# Patient Record
Sex: FEMALE | Race: WHITE | Employment: OTHER | URBAN - METROPOLITAN AREA
[De-identification: names, ages, dates, MRNs, and addresses within clinical notes are randomized per-mention and may not be internally consistent; named-entity substitution may affect disease eponyms.]

---

## 2019-04-26 LAB — MAMMOGRAM: NORMAL

## 2020-10-26 ENCOUNTER — TELEPHONE (OUTPATIENT)
Dept: DERMATOLOGY | Facility: CLINIC | Age: 75
End: 2020-10-26

## 2020-10-26 NOTE — TELEPHONE ENCOUNTER
Called patient to schedule procedure with Dr. Jung, there was no answer.  Left message with my direct line [phone].

## 2020-11-11 ENCOUNTER — MEDICAL CORRESPONDENCE (OUTPATIENT)
Dept: HEALTH INFORMATION MANAGEMENT | Facility: CLINIC | Age: 75
End: 2020-11-11

## 2020-11-11 ENCOUNTER — TRANSFERRED RECORDS (OUTPATIENT)
Dept: HEALTH INFORMATION MANAGEMENT | Facility: CLINIC | Age: 75
End: 2020-11-11

## 2020-11-23 ENCOUNTER — VIRTUAL VISIT (OUTPATIENT)
Dept: DERMATOLOGY | Facility: CLINIC | Age: 75
End: 2020-11-23
Payer: COMMERCIAL

## 2020-11-23 DIAGNOSIS — C44.311 BASAL CELL CARCINOMA OF DORSUM OF NOSE: Primary | ICD-10-CM

## 2020-11-23 PROCEDURE — T1013 SIGN LANG/ORAL INTERPRETER: HCPCS

## 2020-11-23 PROCEDURE — 99441 PR PHYSICIAN TELEPHONE EVALUATION 5-10 MIN: CPT | Mod: 95 | Performed by: DERMATOLOGY

## 2020-11-23 RX ORDER — MULTIVIT WITH IRON,MINERALS
LIQUID (ML) ORAL
COMMUNITY

## 2020-11-23 RX ORDER — TOLTERODINE TARTRATE 2 MG/1
2 TABLET, EXTENDED RELEASE ORAL DAILY
COMMUNITY

## 2020-11-23 RX ORDER — SIMVASTATIN 80 MG
80 TABLET ORAL AT BEDTIME
COMMUNITY

## 2020-11-23 RX ORDER — ALENDRONATE SODIUM 70 MG/1
70 TABLET ORAL
COMMUNITY

## 2020-11-23 ASSESSMENT — PAIN SCALES - GENERAL: PAINLEVEL: NO PAIN (0)

## 2020-11-23 NOTE — NURSING NOTE
Chief Complaint   Patient presents with     Derm Problem     mohs consult BCC nose     Carmen MASTERSON CMA

## 2020-11-23 NOTE — LETTER
11/23/2020       RE: Mariana Arteaga  [address]  [place] [address]     Dear Colleague,    Thank you for referring your patient, Mariana Arteaga, to the [facility] DERMATOLOGIC SURGERY CLINIC [place] at [facility]. Please see a copy of my visit note below.    Hx of Skin Cancer: No  Hx of Mohs Surgery: No  Transplant: No  Current Anticoagulant(s): None  Bleeding Disorder(s): No  Stent: No  Pacemaker: No  Defibrillator: No  Brain/Nerve Stimulator: No  Vascular graft: No  Prophylactic Antibiotic Needed: No  Congenital heart defect: No  Prosthetic Heart Valve: No  Lesion on Leg/Groin: No  Prosthetic Joint : No  Diabetic: No  HIV/AIDS: No  Hepatitis: No  Pregnant: No  Prior Problem with Local Anesthesia: No  Patient wears CPAP mask: No  Photoprotection: occasionally  Sunburns: 1-5 in lifetime  Tanning Bed Use: never  Current Tobacco Use: No  Current Alcohol Use: No  Extended Care Facility: No  Occupation: retired  Referring MD: Dr. Suarez   needed?: Yes  Do you have mobility issues?: No  Do you use any assistive devices/DME?: No  Do you have any issues with lying for long periods of time?: No        [facility] Dermatologic Surgery Consult Note    Dermatology Problem List:  BCC L nasal bridge    CC:   Chief Complaint   Patient presents with     Derm Problem     mohs consult BCC nose       Encounter Date: Nov 23, 2020    Subjective:  Mariana Arteaga is a 75 year old female who is referred to me by,  [facility]* for   on the  .      Patient complains of a lesion on the   for  .  Patient describes lesion as  .    Past Medical History:   There is no problem list on file for this patient.    No past medical history on file.  No past surgical history on file.    Social History:  MOHS CONSULT FLOWSHEET    Family History:  MOHS CONSULT FLOWSHEET    Medications:  Current Outpatient Medications   Medication Sig  Dispense Refill     alendronate (FOSAMAX) 70 MG tablet Take 70 mg by mouth every 7 days       Bioflavonoid Products (VITAMIN C) CHEW        MAGNESIUM PO        Multiple Vitamins-Minerals (LYSIPLEX PLUS) LIQD        Nutritional Supplements (VITAMIN D BOOSTER PO)        simvastatin (ZOCOR) 80 MG tablet Take 80 mg by mouth At Bedtime       tolterodine (DETROL) 2 MG tablet Take 2 mg by mouth daily       Allergies   Allergen Reactions     Ranitidine Difficulty breathing       Pre-Operative Risk Factors:  Current Anticoagulant(s): None.   .    Cardiac Devices:  Stent:   .   Pacemaker:  .   Defibrillator:   .    Transmissible Diseases:  HIV/AIDS:   .   Hepatitis:   .  Patient pregnant:  .    Review of Systems  Constitutional: negative  Eyes: negative  Ears, nose, mouth, throat, and face: negative  Respiratory: negative  Cardiovascular: negative  Gastrointestinal: negative  Genitourinary:negative  Hematologic/lymphatic: negative  Neurological: negative  Behavioral/Psych: negative    Objective:  Physical Exam:  General appearance:  This is a well developed, well-nourished female in no acute distress, in a pleasant mood.    Skin: Focused examination of the face was performed.. Skin exam is normal, except for the following:  Pigmented pearly papule on nasal bridge        Pathologic Findings:  [facility] path -- Pigmented and nodular BCC      Assessment/Plan:  Lesion 1: Pigmented BCC nasal bridge -- discussed R/B of Mohs and likely flap repair.  Patient will schedule surgery.        CC Dr. Hackett on close of this encounter.  Follow-up for surgery    Staff Involved:  Staff Only    Nate Jung MD                        Again, thank you for allowing me to participate in the care of your patient.      Sincerely,    Nate Jung MD

## 2020-11-23 NOTE — LETTER
11/23/2020      RE: Mariana Arteaga  [address]  [place] [address]       Hx of Skin Cancer: No  Hx of Mohs Surgery: No  Transplant: No  Current Anticoagulant(s): None  Bleeding Disorder(s): No  Stent: No  Pacemaker: No  Defibrillator: No  Brain/Nerve Stimulator: No  Vascular graft: No  Prophylactic Antibiotic Needed: No  Congenital heart defect: No  Prosthetic Heart Valve: No  Lesion on Leg/Groin: No  Prosthetic Joint : No  Diabetic: No  HIV/AIDS: No  Hepatitis: No  Pregnant: No  Prior Problem with Local Anesthesia: No  Patient wears CPAP mask: No  Photoprotection: occasionally  Sunburns: 1-5 in lifetime  Tanning Bed Use: never  Current Tobacco Use: No  Current Alcohol Use: No  Extended Care Facility: No  Occupation: retired  Referring MD: Dr. Suarez   needed?: Yes  Do you have mobility issues?: No  Do you use any assistive devices/DME?: No  Do you have any issues with lying for long periods of time?: No        [facility] Dermatologic Surgery Consult Note    Dermatology Problem List:  BCC L nasal bridge    CC:   Chief Complaint   Patient presents with     Derm Problem     mohs consult BCC nose       Encounter Date: Nov 23, 2020    Subjective:  Mariana Arteaga is a 75 year old female who is referred to me by,  [facility]* for   on the  .      Patient complains of a lesion on the   for  .  Patient describes lesion as  .    Past Medical History:   There is no problem list on file for this patient.    No past medical history on file.  No past surgical history on file.    Social History:  MOHS CONSULT FLOWSHEET    Family History:  MOHS CONSULT FLOWSHEET    Medications:  Current Outpatient Medications   Medication Sig Dispense Refill     alendronate (FOSAMAX) 70 MG tablet Take 70 mg by mouth every 7 days       Bioflavonoid Products (VITAMIN C) CHEW        MAGNESIUM PO        Multiple Vitamins-Minerals (LYSIPLEX PLUS) LIQD        Nutritional Supplements  (VITAMIN D BOOSTER PO)        simvastatin (ZOCOR) 80 MG tablet Take 80 mg by mouth At Bedtime       tolterodine (DETROL) 2 MG tablet Take 2 mg by mouth daily       Allergies   Allergen Reactions     Ranitidine Difficulty breathing       Pre-Operative Risk Factors:  Current Anticoagulant(s): None.   .    Cardiac Devices:  Stent:   .   Pacemaker:  .   Defibrillator:   .    Transmissible Diseases:  HIV/AIDS:   .   Hepatitis:   .  Patient pregnant:  .    Review of Systems  Constitutional: negative  Eyes: negative  Ears, nose, mouth, throat, and face: negative  Respiratory: negative  Cardiovascular: negative  Gastrointestinal: negative  Genitourinary:negative  Hematologic/lymphatic: negative  Neurological: negative  Behavioral/Psych: negative    Objective:  Physical Exam:  General appearance:  This is a well developed, well-nourished female in no acute distress, in a pleasant mood.    Skin: Focused examination of the face was performed.. Skin exam is normal, except for the following:  Pigmented pearly papule on nasal bridge        Pathologic Findings:  [facility] -- Pigmented and nodular BCC      Assessment/Plan:  Lesion 1: Pigmented BCC nasal bridge -- discussed R/B of Mohs and likely flap repair.  Patient will schedule surgery.        CC Dr. Hackett on close of this encounter.  Follow-up for surgery    Staff Involved:  Staff Only    Nate Jung MD

## 2020-11-24 ENCOUNTER — PRE VISIT (OUTPATIENT)
Dept: DERMATOLOGY | Facility: CLINIC | Age: 75
End: 2020-11-24

## 2020-11-24 ENCOUNTER — OFFICE VISIT (OUTPATIENT)
Dept: DERMATOLOGY | Facility: CLINIC | Age: 75
End: 2020-11-24
Payer: COMMERCIAL

## 2020-11-24 VITALS — HEART RATE: 80 BPM | DIASTOLIC BLOOD PRESSURE: 74 MMHG | SYSTOLIC BLOOD PRESSURE: 142 MMHG

## 2020-11-24 DIAGNOSIS — C44.311 BASAL CELL CARCINOMA OF DORSUM OF NOSE: ICD-10-CM

## 2020-11-24 DIAGNOSIS — G89.18 POST-OPERATIVE PAIN: Primary | ICD-10-CM

## 2020-11-24 PROCEDURE — 14060 TIS TRNFR E/N/E/L 10 SQ CM/<: CPT | Mod: GC | Performed by: DERMATOLOGY

## 2020-11-24 PROCEDURE — 17311 MOHS 1 STAGE H/N/HF/G: CPT | Mod: GC | Performed by: DERMATOLOGY

## 2020-11-24 RX ORDER — ACETAMINOPHEN 500 MG
1000 TABLET ORAL ONCE
Status: COMPLETED | OUTPATIENT
Start: 2020-11-24 | End: 2020-11-24

## 2020-11-24 RX ADMIN — Medication 1000 MG: at 11:00

## 2020-11-24 ASSESSMENT — PAIN SCALES - GENERAL: PAINLEVEL: NO PAIN (0)

## 2020-11-24 NOTE — TELEPHONE ENCOUNTER
FUTURE VISIT INFORMATION      FUTURE VISIT INFORMATION:    Date: 11.24.20    Time: 8:30    Location: [facility]  REFERRAL INFORMATION:    Referring provider:  Dr. Matilde Suarez    Referring providers clinic:  [facility]    Reason for visit/diagnosis  Mohs L nasal ala BCC Please use video or phone  at [phone] option 1    RECORDS REQUESTED FROM:       Clinic name Comments Records Status Photos Status   [facility] 11.23.20- telephone consult  Dr. Fabiana Ronquillo N/A   [facility] 10.13.20  Dr. Suarez  Path # [id number] [facility] Epic

## 2020-11-24 NOTE — LETTER
Date:December 3, 2020      Patient was self referred, no letter generated. Do not send.        [facility] Health Information

## 2020-11-24 NOTE — LETTER
11/24/2020       RE: Mariana Arteaga  [address]  [place] [address]     Dear Colleague,    Thank you for referring your patient, Mariana Arteaga, to the [facility] DERMATOLOGIC SURGERY CLINIC [place] at [facility]. Please see a copy of my visit note below.    [facility] Dermatologic Surgery Procedure Note      Date of Service:  Nov 24, 2020  Surgery: Mohs micrographic surgery    Case 1  Repair Type: rhombic transposition flap  Repair Size: 2.5 x 2.5 cm  Suture Material: 5-0 Monocryl and 5-0 Fast absorbing gut  Tumor Type: BCC  Location: L nasal bridge  Derm-Path Accession #: [id number]  PreOp Size: 0.8 x 0.6 cm  PostOp Size: 1.2 x 1.0 cm  Mohs Accession #:   Level of Defect: muscle      Procedure:  We discussed the principles of treatment and most likely complications including scarring, bleeding, infection, swelling, pain, crusting, nerve damage, large wound,  incomplete excision, wound dehiscence,  nerve damage, recurrence, and a second procedure may be recommended to obtain the best cosmetic or functional result.    Informed consent was obtained and the patient underwent the procedure as follows:  The patient was placed supine on the operating table.  The cancer was identified, outlined with a marker, and verified by the patient.  The entire surgical field was prepped with Hibiclens.  The surgical site was anesthetized using Lidocaine 1% with epi 1:100,000.      The area of clinically apparent tumor was debulked. The layer of tissue was then surgically excised using a #15 blade and was then transferred onto a specimen sheet maintaining the orientation of the specimen. Hemostasis was obtained using heat cautery. The wound site was then covered with a dressing while the tissue samples were processed for examination.    The excised tissue was transported to the Mohs histology laboratory maintaining the tissue  orientation.  The tissue specimen was relaxed so that the entire surgical margin was in a a single horizontal plane for sectioning and inked for precise mapping.  A precise reference map was drawn to reflect the sectioning of the specimen, colored inking of the margins, and orientation on the patient. The tissue was processed using horizontal sectioning of the base and continuous peripheral margins.  The histopathologic sections were reviewed in conjunction with the reference map.    Total blocks: 1    Total slides:  2    There were no cancer cells visualized on examination, therefore Mohs surgery was complete.    PROCEDURE: Rhombic Transposition Flap    The patient was taken to the operative suite and placed supine on the operating room table.  The wound was identified and infiltrated with 1% lidocaine with epinephrine.  The defect was then cleansed and prepped with chlorhexidine and draped with sterile drapes.  Using a marker, a rhomboid transposition flap repair was planned.  The wound edges were then debeveled and the wound was undermined bluntly in all directions.  The transposition flap was incised sharply to the level of fat.  The flap was undermined from all surrounding tissue. Hemostasis was obtained with electrocoagulation.  The flap was transposed into the primary defect.  The secondary defect and flap closed with deep dermal 5-0 Monocryl sutures Epidermal tissue was carefully approximated using 5-0 simple running epidermal sutures throughout the length of the flap.  Redundant skin was excised by the triangulation technique, and closed in similar fashion.  The wound was cleansed with sterile saline and Vaseline was applied. A sterile non-adherent pressure dressing was placed.  The patient left the operating suite in stable condition.  The patient will return for virtual follow up in 2 weeks. Wound care was reviewed verbally and in writing.     Nate Jung was present for the Mohs procedure and key portions  of the recontruction procedure and always immediately available.                Photo placed into patients chart note for further reference.       Justin Garcia MD  PGY-6    Micrographic Surgery and Dermatologic Oncology Fellow  November 24, 2020      Attending attestation:  I was present for key elements of the procedure and immediately available for all other portions of the procedure.  I have reviewed the note and edited it as necessary.    Nate Jung M.D.  Professor  Director of Dermatologic Surgery  Department of Dermatology  [facility]    Dermatology Surgery Clinic  [facility] [place] [address]                  Again, thank you for allowing me to participate in the care of your patient.      Sincerely,    Nate Jung MD

## 2020-11-24 NOTE — PATIENT INSTRUCTIONS
Wound Care Instructions  I will experience scar, altered skin color, bleeding, swelling, pain, crusting and redness. I may experience altered sensation. Risks are excessive bleeding, infection, muscle weakness, thick (hypertrophic or keloidal) scar, and recurrence,. A second procedure may be recommended to obtain the best cosmetic or functional result.  Possible complications of any surgical procedure are bleeding, infection, scarring, alteration in skin color and sensation, muscle weakness in the area, wound dehiscence or seperation, or recurrence of the lesion or disease. On occasion, after healing, a secondary procedure or revision may be recommended in order to obtain the best cosmetic or functional result.   After your surgery, a pressure bandage will be placed over the area that has sutures. This will help prevent bleeding.   For the First 48 hours After Surgery:  1. Leave the pressure bandage on and keep it dry. If it should come loose, you may retape it, but do not take it off.  2. Relax and take it easy. Do not do any vigorous exercise, heavy lifting, or bending forward. This could cause the wound to bleed.  3. Post-operative pain is usually mild. You may take plain or extra strength Tylenol every 4 hours as needed (do not take more than 4,000mg in one day). Do not take any medicine that contains aspirin, ibuprofen or motrin unless you have been recommended these by a doctor.  Avoid alcohol and vitamin E as these may increase your tendency to bleed.  4. You may put an ice pack around the bandaged area for 20 minutes every 2-3 hours. This may help reduce swelling, bruising, and pain. Make sure the ice pack is waterproof so that the pressure bandage does not get wet.   5. You may see a small amount of drainage or blood on your pressure bandage. This is normal. However, if drainage or bleeding continues or saturates the bandage, you will need to apply firm pressure over the bandage with a washcloth for 15  minutes. If bleeding continues after applying pressure for 15 minutes then go to the nearest emergency room.  48 Hours After Surgery  Carefully remove the bandage and start daily wound care and dressing changes. You may also now shower and get the wound wet. Wash wound with a mild soap and water.  Use caution when washing the wound. Be gentle and do not let the forceful shower stream hit the wound directly.  PAT dry.  Daily Wound Care:  1. Wash wound with a mild soap and water.  Use caution when washing the wound, be gentle and do not let the forceful shower stream hit the wound directly.  2. PAT DRY.  3. Apply Vaseline (from a new container or tube) over the suture line with a Q-tip. It is very important to keep the wound continuously moist, as wounds heal best in a moist environment.  4.  Keep the site covered until sutures are removed, you can cover it with a Telfa (non-stick) dressing and tape or a band-aid.    5. If you are unable to keep wound covered, you must apply Vaseline every 2 - 3 hours (while awake) to ensure it is being kept moist for optimal healing. A dressing overnight is recommended to keep the area moist.   Call Us If:  1. You have pain that is not controlled with Tylenol.  2. You have signs or symptoms of an infection, such as: fever over 100 degrees F, redness, warmth, or foul-smelling or yellow/creamy drainage from the wound.  Who should I call with questions?    [facility]: [phone]     [facility]: [phone]    For urgent needs outside of business hours call the [facility] at [phone] and ask for the dermatology resident on call

## 2020-11-24 NOTE — NURSING NOTE
Chief Complaint   Patient presents with     Derm Problem     L nasal bridge BCC     Meli Snowden, EMT

## 2020-11-24 NOTE — PROGRESS NOTES
[facility] Mohs Dermatologic Surgery Procedure Note      Date of Service:  Nov 24, 2020  Surgery: Mohs micrographic surgery    Case 1  Repair Type: rhombic transposition flap  Repair Size: 2.5 x 2.5 cm  Suture Material: 5-0 Monocryl and 5-0 Fast absorbing gut  Tumor Type: BCC  Location: L nasal bridge  Derm-Path Accession #: [id number]  PreOp Size: 0.8 x 0.6 cm  PostOp Size: 1.2 x 1.0 cm  Mohs Accession #:   Level of Defect: muscle      Procedure:  We discussed the principles of treatment and most likely complications including scarring, bleeding, infection, swelling, pain, crusting, nerve damage, large wound,  incomplete excision, wound dehiscence,  nerve damage, recurrence, and a second procedure may be recommended to obtain the best cosmetic or functional result.    Informed consent was obtained and the patient underwent the procedure as follows:  The patient was placed supine on the operating table.  The cancer was identified, outlined with a marker, and verified by the patient.  The entire surgical field was prepped with Hibiclens.  The surgical site was anesthetized using Lidocaine 1% with epi 1:100,000.      The area of clinically apparent tumor was debulked. The layer of tissue was then surgically excised using a #15 blade and was then transferred onto a specimen sheet maintaining the orientation of the specimen. Hemostasis was obtained using heat cautery. The wound site was then covered with a dressing while the tissue samples were processed for examination.    The excised tissue was transported to the Mohs histology laboratory maintaining the tissue orientation.  The tissue specimen was relaxed so that the entire surgical margin was in a a single horizontal plane for sectioning and inked for precise mapping.  A precise reference map was drawn to reflect the sectioning of the specimen, colored inking of the margins, and orientation on the patient. The tissue was processed using  horizontal sectioning of the base and continuous peripheral margins.  The histopathologic sections were reviewed in conjunction with the reference map.    Total blocks: 1    Total slides:  2    There were no cancer cells visualized on examination, therefore Mohs surgery was complete.    PROCEDURE: Rhombic Transposition Flap    The patient was taken to the operative suite and placed supine on the operating room table.  The wound was identified and infiltrated with 1% lidocaine with epinephrine.  The defect was then cleansed and prepped with chlorhexidine and draped with sterile drapes.  Using a marker, a rhomboid transposition flap repair was planned.  The wound edges were then debeveled and the wound was undermined bluntly in all directions.  The transposition flap was incised sharply to the level of fat.  The flap was undermined from all surrounding tissue. Hemostasis was obtained with electrocoagulation.  The flap was transposed into the primary defect.  The secondary defect and flap closed with deep dermal 5-0 Monocryl sutures Epidermal tissue was carefully approximated using 5-0 simple running epidermal sutures throughout the length of the flap.  Redundant skin was excised by the triangulation technique, and closed in similar fashion.  The wound was cleansed with sterile saline and Vaseline was applied. A sterile non-adherent pressure dressing was placed.  The patient left the operating suite in stable condition.  The patient will return for virtual follow up in 2 weeks. Wound care was reviewed verbally and in writing.     Nate Jung was present for the Mohs procedure and key portions of the recontruction procedure and always immediately available.                Photo placed into patients chart note for further reference.       Justin Garcia MD  PGY-6    Micrographic Surgery and Dermatologic Oncology Fellow  November 24, 2020      Attending attestation:  I was present for key elements of the procedure and  immediately available for all other portions of the procedure.  I have reviewed the note and edited it as necessary.    Nate Jung M.D.  Professor  Director of Dermatologic Surgery  Department of Dermatology  [facility]    Dermatology Surgery Clinic  [place] [phone]

## 2020-12-02 ENCOUNTER — DOCUMENTATION ONLY (OUTPATIENT)
Dept: CARE COORDINATION | Facility: CLINIC | Age: 75
End: 2020-12-02

## 2020-12-07 ENCOUNTER — VIRTUAL VISIT (OUTPATIENT)
Dept: DERMATOLOGY | Facility: CLINIC | Age: 75
End: 2020-12-07
Payer: COMMERCIAL

## 2020-12-07 DIAGNOSIS — Z85.828 HISTORY OF BASAL CELL CARCINOMA (BCC): Primary | ICD-10-CM

## 2020-12-07 DIAGNOSIS — Z98.890 STATUS POST MOHS SURGERY: ICD-10-CM

## 2020-12-07 PROCEDURE — 99024 POSTOP FOLLOW-UP VISIT: CPT | Mod: 95 | Performed by: DERMATOLOGY

## 2020-12-07 ASSESSMENT — PAIN SCALES - GENERAL: PAINLEVEL: NO PAIN (0)

## 2020-12-07 NOTE — PROGRESS NOTES
Preventive Care:    Colorectal Cancer Screening: During our visit today, we discussed that it is recommended you receive colorectal cancer screening. Please call or make an appointment with your primary care provider to discuss this. You may also call the [facility] scheduling line ([phone]) to set up a colonoscopy appointment.

## 2020-12-07 NOTE — LETTER
Date:December 18, 2020      Patient was self referred, no letter generated. Do not send.        [facility] Phys[facility] Health Information

## 2020-12-07 NOTE — NURSING NOTE
Chief Complaint   Patient presents with     Derm Problem     Mariana has a telephone visit to recheck nasal bridge.      Carmen Guajardo LPN

## 2020-12-07 NOTE — PROGRESS NOTES
[facility] Dermatology Record:  Store and Forward and Telephone [phone]      Dermatology Problem List:  Dermatology Problem List:  1. BCC, L nasal bridge, s/p MMS and rhombic transposition flap repair on 11/24/2020  2. Scar vs cyst: R upper buttock      Encounter Date: Dec 7, 2020    CC:   Chief Complaint   Patient presents with     Derm Problem     Mariana has a telephone visit to recheck nasal bridge.        History of Present Illness:  Mariana Arteaga is a 75 year old female who presents for follow up 2 weeks after MMS for BCC of the left nasal bridge and subsequent rhombic transposition flap repair.    Overall she feels that she is healing very well.  She reports applying Vaseline consistently, denies pain or drainage. She has had some itching near the medial aspect of each eye. Also notes some fullness over the left upper eyelid. She reports a history of stye/chalazion in the past.     Today's conversation was facilitated by a .     ROS: Patient is generally feeling well today.     Physical Examination:  Skin: Focused examination including photos submitted by the patient of the nasal bridge was performed.   - Over the central nasal bridge is a well healing, completely viable rhombic transposition flap without evidence of infection or flap compromise.   - Left upper eyelid with skin colored to pink nodule.     Labs:  NA    Past Medical History:   There is no problem list on file for this patient.    No past medical history on file.  No past surgical history on file.    Social History:  Patient reports that she has never smoked. She has never used smokeless tobacco.    Family History:  No family history on file.    Medications:  Current Outpatient Medications   Medication     alendronate (FOSAMAX) 70 MG tablet     Bioflavonoid Products (VITAMIN C) CHEW     MAGNESIUM PO     Multiple Vitamins-Minerals (LYSIPLEX PLUS) LIQD     Nutritional Supplements (VITAMIN D BOOSTER PO)     simvastatin  (ZOCOR) 80 MG tablet     tolterodine (DETROL) 2 MG tablet     No current facility-administered medications for this visit.           Allergies   Allergen Reactions     Ranitidine Difficulty breathing           Impression and Recommendations (Patient Counseled on the Following):  1. History of BCC, nasal bridge, s/p MMS and rhombic transposition flap closure   - Healing well, continue Vaseline over any areas that persistently crust. Anticipate no further wound care after 4-6 days.   - Follow up 3 months for scar check     2. Eyelid fullness, consistent with Stye/chalazion   - Left upper eyelid  - Recommend warm compresses and consultation with ophthalmology if persistent. Patient agrees. I informed the patient that dermatology can help if she is not able to be seen by ophthalmology.       Follow-up:   3 months for scar evaluation.      Staff and fellow    Justin Garcia MD  PGY-6    Micrographic Surgery and Dermatologic Oncology Fellow  December 7, 2020      _____________________________________________________________________________    Teledermatology information:  - Location of patient: [place]  - Patient presented as: return  - Location of teledermatologist:  ([facility] DERMATOLOG[facility] SURG[facility] CLINIC [place] )  - Image quality and interpretability: acceptable  - Physician has received verbal consent for a Video/Photos Visit from the patient? YES  - In-person dermatology visit recommendation: no  - Date of images: December 7, 2020  - Service start time: 1420  - Service end time: 1430  - Date of report: 12/7/2020

## 2020-12-07 NOTE — PATIENT INSTRUCTIONS
[facility] Dermatology Visit    Thank you for allowing us to participate in your care. Your findings, instructions and follow-up plan are as follows:    We will see you in 2-3 months to check on you.    When should I call my doctor?    If you are worsening or not improving, please, contact us or seek urgent care as noted below.     Who should I call with questions (adults)?    [facility] (adult and pediatric): [phone]     [facility] (adult): [phone]    For urgent needs outside of business hours call the [facility] at [phone] and ask for the dermatology resident on call    If this is a medical emergency and you are unable to reach an ER, Call [phone]      Who should I call with questions (pediatric)?  [facility]- Pediatric Dermatology  Dr. Amie Pedersen, Dr. Jenifer Ng, Dr. Sole Simpson, Michelle Hotl, PA  Dr. Darlene Araujo, Dr. Leanne Sapp & Dr. Yassine Phillips  Non Urgent  Nurse Triage Line; [phone]- Margie and Brielle NOBLES Care Coordinators   Tiffani (/Complex ) [phone]    If you need a prescription refill, please contact your pharmacy. Refills are approved or denied by our Physicians during normal business hours, Monday through Fridays  Per office policy, refills will not be granted if you have not been seen within the past year (or sooner depending on your child's condition)    Scheduling Information:  Pediatric Appointment Scheduling and Call Center [phone]  Radiology Scheduling- [phone]  Sedation Unit Scheduling- [phone]  [place] Scheduling- General [phone]; Pediatric Dermatology [phone]  Main  Services: [phone]  Turkish: [phone]  Welsh: [phone]  Hmong/Paraguayan/Sami: [phone]  Preadmission Nursing Department Fax Number: [phone] (Fax all pre-operative paperwork to this  number)    For urgent matters arising during evenings, weekends, or holidays that cannot wait for normal business hours please call [phone] and ask for the Dermatology Resident On-Call to be paged.

## 2020-12-21 NOTE — PROGRESS NOTES
Hx of Skin Cancer: No  Hx of Mohs Surgery: No  Transplant: No  Current Anticoagulant(s): None  Bleeding Disorder(s): No  Stent: No  Pacemaker: No  Defibrillator: No  Brain/Nerve Stimulator: No  Vascular graft: No  Prophylactic Antibiotic Needed: No  Congenital heart defect: No  Prosthetic Heart Valve: No  Lesion on Leg/Groin: No  Prosthetic Joint : No  Diabetic: No  HIV/AIDS: No  Hepatitis: No  Pregnant: No  Prior Problem with Local Anesthesia: No  Patient wears CPAP mask: No  Photoprotection: occasionally  Sunburns: 1-5 in lifetime  Tanning Bed Use: never  Current Tobacco Use: No  Current Alcohol Use: No  Extended Care Facility: No  Occupation: retired  Referring MD: Dr. Suarez   needed?: Yes  Do you have mobility issues?: No  Do you use any assistive devices/DME?: No  Do you have any issues with lying for long periods of time?: No        [facility] Dermatologic Surgery Consult Note    Dermatology Problem List:  BCC L nasal bridge    CC:   Chief Complaint   Patient presents with     Derm Problem     mohs consult BCC nose       Encounter Date: Nov 23, 2020    Subjective:  Mariana Arteaga is a 75 year old female who is referred to me by,  [facility]* for   on the  .      Patient complains of a lesion on the   for  .  Patient describes lesion as  .    Past Medical History:   There is no problem list on file for this patient.    No past medical history on file.  No past surgical history on file.    Social History:  MOHS CONSULT FLOWSHEET    Family History:  MOHS CONSULT FLOWSHEET    Medications:  Current Outpatient Medications   Medication Sig Dispense Refill     alendronate (FOSAMAX) 70 MG tablet Take 70 mg by mouth every 7 days       Bioflavonoid Products (VITAMIN C) CHEW        MAGNESIUM PO        Multiple Vitamins-Minerals (LYSIPLEX PLUS) LIQD        Nutritional Supplements (VITAMIN D BOOSTER PO)        simvastatin (ZOCOR) 80 MG tablet Take 80 mg by mouth At Bedtime        tolterodine (DETROL) 2 MG tablet Take 2 mg by mouth daily       Allergies   Allergen Reactions     Ranitidine Difficulty breathing       Pre-Operative Risk Factors:  Current Anticoagulant(s): None.   .    Cardiac Devices:  Stent:   .   Pacemaker:  .   Defibrillator:   .    Transmissible Diseases:  HIV/AIDS:   .   Hepatitis:   .  Patient pregnant:  .    Review of Systems  Constitutional: negative  Eyes: negative  Ears, nose, mouth, throat, and face: negative  Respiratory: negative  Cardiovascular: negative  Gastrointestinal: negative  Genitourinary:negative  Hematologic/lymphatic: negative  Neurological: negative  Behavioral/Psych: negative    Objective:  Physical Exam:  General appearance:  This is a well developed, well-nourished female in no acute distress, in a pleasant mood.    Skin: Focused examination of the face was performed.. Skin exam is normal, except for the following:  Pigmented pearly papule on nasal bridge        Pathologic Findings:  [facility] [place] -- Pigmented and nodular BCC      Assessment/Plan:  Lesion 1: Pigmented BCC nasal bridge -- discussed R/B of Mohs and likely flap repair.  Patient will schedule surgery.        CC Dr. Hackett on close of this encounter.  Follow-up for surgery    Staff Involved:  Staff Only    Nate Jung MD

## 2021-01-09 ENCOUNTER — HEALTH MAINTENANCE LETTER (OUTPATIENT)
Age: 76
End: 2021-01-09

## 2021-04-19 ENCOUNTER — VIRTUAL VISIT (OUTPATIENT)
Dept: DERMATOLOGY | Facility: CLINIC | Age: 76
End: 2021-04-19
Payer: COMMERCIAL

## 2021-04-19 DIAGNOSIS — Z51.89 VISIT FOR WOUND CHECK: Primary | ICD-10-CM

## 2021-04-19 PROCEDURE — 99024 POSTOP FOLLOW-UP VISIT: CPT | Mod: 95 | Performed by: DERMATOLOGY

## 2021-04-19 NOTE — LETTER
Date:April 27, 2021      Patient was self referred, no letter generated. Do not send.        [facility] Health Information

## 2021-04-19 NOTE — NURSING NOTE
Chief Complaint   Patient presents with     Derm Problem     wound check, no concerns at this time.      Meli Snowden, EMT

## 2021-04-19 NOTE — LETTER
4/19/2021       RE: Mariana Arteaga  [address]  [place] [address]     Dear Colleague,    Thank you for referring your patient, Mariana Arteaga, to the [facility] DERMATOLOGIC SURGERY CLINIC [place] at [facility]. Please see a copy of my visit note below.    [facility] Dermatology Note  Encounter Date: Apr 19, 2021  Store-and-Forward and Telephone ([phone]). Location of teledermatologist: [facility] DERMATOLOGIC SURGERY CLINIC [place].  Start time: 1500. End time: 1510.    Dermatology Problem List:  Dermatology Problem List:  1. BCC, L nasal bridge, s/p MMS and rhombic transposition flap repair on 11/24/2020  2. Scar vs cyst: R upper buttock    ____________________________________________    Assessment & Plan:     1. BCC, L nasal bridge, s/p MMS and rhombic transposition flap repair on 11/24/2020    Procedures Performed:    None    Follow-up: for regular skin exam every 6 months    Staff and Fellow:     Justin Garcia MD    ____________________________________________    CC: Derm Problem (wound check, no concerns at this time. )    HPI:  Ms. Mariana Arteaga is a(n) 76 year old female who presents today for follow-up  for wound check after Mohs surgery on the nasal bridge with transposition flap repair on 11/24/2021.     The patient is doing well and she has no complaints.     Patient is otherwise feeling well, without additional skin concerns.    Labs Reviewed:  N/A    Physical Exam:  Vitals: There were no vitals taken for this visit.  SKIN: Teledermatology photos were reviewed; image quality and interpretability: acceptable. Image date: 4/19/2021.  - Over the glabella and nasal bridge is a very well healed transposition flap with subtle post procedural erythema.    - No other lesions of concern on areas examined.     Medications:  Current Outpatient Medications   Medication     alendronate  (FOSAMAX) 70 MG tablet     Bioflavonoid Products (VITAMIN C) CHEW     MAGNESIUM PO     Multiple Vitamins-Minerals (LYSIPLEX PLUS) LIQD     Nutritional Supplements (VITAMIN D BOOSTER PO)     simvastatin (ZOCOR) 80 MG tablet     tolterodine (DETROL) 2 MG tablet     No current facility-administered medications for this visit.       Past Medical/Surgical History:   There is no problem list on file for this patient.    No past medical history on file.    CC No referring provider defined for this encounter. on close of this encounter.    Attestation signed by Nate Jung MD at 4/26/2021 11:22 AM:  Attending Attestation  I attest that the Fellow recorded the interview and exam that I personally performed.  I have reviewed the note and edited it as necessary.    Nate Jung M.D.  Professor  Director of Dermatologic Surgery  Department of Dermatology  [facility]        Again, thank you for allowing me to participate in the care of your patient.      Sincerely,    Nate Jung MD

## 2021-04-19 NOTE — PROGRESS NOTES
[facility] Dermatology Note  Encounter Date: Apr 19, 2021  Store-and-Forward and Telephone ([phone]). Location of teledermatologist: [facility] DERMATOLOGIC SURGERY CLINIC [place].  Start time: 1500. End time: 1510.    Dermatology Problem List:  Dermatology Problem List:  1. BCC, L nasal bridge, s/p MMS and rhombic transposition flap repair on 11/24/2020  2. Scar vs cyst: R upper buttock    ____________________________________________    Assessment & Plan:     1. BCC, L nasal bridge, s/p MMS and rhombic transposition flap repair on 11/24/2020    Procedures Performed:    None    Follow-up: for regular skin exam every 6 months    Staff and Fellow:     Justin Garcia MD    ____________________________________________    CC: Derm Problem (wound check, no concerns at this time. )    HPI:  Ms. Mariana Arteaga is a(n) 76 year old female who presents today for follow-up  for wound check after Mohs surgery on the nasal bridge with transposition flap repair on 11/24/2021.     The patient is doing well and she has no complaints.     Patient is otherwise feeling well, without additional skin concerns.    Labs Reviewed:  N/A    Physical Exam:  Vitals: There were no vitals taken for this visit.  SKIN: Teledermatology photos were reviewed; image quality and interpretability: acceptable. Image date: 4/19/2021.  - Over the glabella and nasal bridge is a very well healed transposition flap with subtle post procedural erythema.    - No other lesions of concern on areas examined.     Medications:  Current Outpatient Medications   Medication     alendronate (FOSAMAX) 70 MG tablet     Bioflavonoid Products (VITAMIN C) CHEW     MAGNESIUM PO     Multiple Vitamins-Minerals (LYSIPLEX PLUS) LIQD     Nutritional Supplements (VITAMIN D BOOSTER PO)     simvastatin (ZOCOR) 80 MG tablet     tolterodine (DETROL) 2 MG tablet     No current facility-administered medications for this visit.       Past  Medical/Surgical History:   There is no problem list on file for this patient.    No past medical history on file.    CC No referring provider defined for this encounter. on close of this encounter.

## 2021-10-11 ENCOUNTER — HEALTH MAINTENANCE LETTER (OUTPATIENT)
Age: 76
End: 2021-10-11

## 2022-01-30 ENCOUNTER — HEALTH MAINTENANCE LETTER (OUTPATIENT)
Age: 77
End: 2022-01-30

## 2022-09-24 ENCOUNTER — HEALTH MAINTENANCE LETTER (OUTPATIENT)
Age: 77
End: 2022-09-24

## 2023-02-01 NOTE — LETTER
12/7/2020       RE: Mariana Arteaga  [address]  [place] [address]     Dear Colleague,    Thank you for referring your patient, Mariana Arteaga, to the [facility] SURGERY [facility]IN[facility] [place] at [facility]. Please see a copy of my visit note below.    Preventive Care:    Colorectal Cancer Screening: During our visit today, we discussed that it is recommended you receive colorectal cancer screening. Please call or make an appointment with your primary care provider to discuss this. You may also call the [facility] scheduling line ([phone]) to set up a colonoscopy appointment.        [facility] Dermatology Record:  Store and Forward and Telephone [phone]      Dermatology Problem List:  Dermatology Problem List:  1. BCC, L nasal bridge, s/p MMS and rhombic transposition flap repair on 11/24/2020  2. Scar vs cyst: R upper buttock      Encounter Date: Dec 7, 2020    CC:   Chief Complaint   Patient presents with     Derm Problem     Mariana has a telephone visit to recheck nasal bridge.        History of Present Illness:  Mariana Arteaga is a 75 year old female who presents for follow up 2 weeks after MMS for BCC of the left nasal bridge and subsequent rhombic transposition flap repair.    Overall she feels that she is healing very well.  She reports applying Vaseline consistently, denies pain or drainage. She has had some itching near the medial aspect of each eye. Also notes some fullness over the left upper eyelid. She reports a history of stye/chalazion in the past.     Today's conversation was facilitated by a .     ROS: Patient is generally feeling well today.     Physical Examination:  Skin: Focused examination including photos submitted by the patient of the nasal bridge was performed.   - Over the central nasal bridge is a well healing, completely viable rhombic transposition flap without evidence of  infection or flap compromise.   - Left upper eyelid with skin colored to pink nodule.     Labs:  NA    Past Medical History:   There is no problem list on file for this patient.    No past medical history on file.  No past surgical history on file.    Social History:  Patient reports that she has never smoked. She has never used smokeless tobacco.    Family History:  No family history on file.    Medications:  Current Outpatient Medications   Medication     alendronate (FOSAMAX) 70 MG tablet     Bioflavonoid Products (VITAMIN C) CHEW     MAGNESIUM PO     Multiple Vitamins-Minerals (LYSIPLEX PLUS) LIQD     Nutritional Supplements (VITAMIN D BOOSTER PO)     simvastatin (ZOCOR) 80 MG tablet     tolterodine (DETROL) 2 MG tablet     No current facility-administered medications for this visit.           Allergies   Allergen Reactions     Ranitidine Difficulty breathing           Impression and Recommendations (Patient Counseled on the Following):  1. History of BCC, nasal bridge, s/p MMS and rhombic transposition flap closure   - Healing well, continue Vaseline over any areas that persistently crust. Anticipate no further wound care after 4-6 days.   - Follow up 3 months for scar check     2. Eyelid fullness, consistent with Stye/chalazion   - Left upper eyelid  - Recommend warm compresses and consultation with ophthalmology if persistent. Patient agrees. I informed the patient that dermatology can help if she is not able to be seen by ophthalmology.       Follow-up:   3 months for scar evaluation.      Staff and fellow    Justin Garcia MD  PGY-6    Micrographic Surgery and Dermatologic Oncology Fellow  December 7, 2020      _____________________________________________________________________________    Teledermatology information:  - Location of patient: [place]  - Patient presented as: return  - Location of teledermatologist:  ([facility] DERMATOLOGIC SURGERY CLINIC [place] )  - Image quality and  interpretability: acceptable  - Physician has received verbal consent for a Video/Photos Visit from the patient? YES  - In-person dermatology visit recommendation: no  - Date of images: December 7, 2020  - Service start time: 1420  - Service end time: 1430  - Date of report: 12/7/2020     Attestation signed by Nate Jung MD at 12/15/2020 10:28 AM:  Attending Attestation  I attest that the Fellow recorded the interview and exam that I was present for.  I have reviewed the note and edited it as necessary.    Nate Jung M.D.  Professor  Director of Dermatologic Surgery  Department of Dermatology  [facility]        Again, thank you for allowing me to participate in the care of your patient.      Sincerely,    Nate Jung MD       Tarsorrhaphy Text: A tarsorrhaphy was performed using Frost sutures.

## 2023-05-08 ENCOUNTER — HEALTH MAINTENANCE LETTER (OUTPATIENT)
Age: 78
End: 2023-05-08

## (undated) RX ORDER — ACETAMINOPHEN 500 MG
TABLET ORAL
Status: DISPENSED
Start: 2020-11-24